# Patient Record
(demographics unavailable — no encounter records)

---

## 2025-06-10 NOTE — HISTORY OF PRESENT ILLNESS
[de-identified] : Patient is a 51 year old F here to establish care and complete physical  interested in testing for lyme testing given potential exposure history of bad welt reaction on arm to yellow jacket bite   chronic cough present for years unclear etiology unpredictable -not necessarily on a daily basis it happens sporadically  cannot figure out the trigger at this time  [FreeTextEntry1] : Nettie Crandall is a 51 year old female here today for establish care and complete physical exam.

## 2025-06-10 NOTE — HEALTH RISK ASSESSMENT
[Very Good] : ~his/her~  mood as very good [Yes] : Yes [1 or 2 (0 pts)] : 1 or 2 (0 points) [2 - 4 times a month (2 pts)] : 2-4 times a month (2 points) [Never (0 pts)] : Never (0 points) [No falls in past year] : Patient reported no falls in the past year [Little interest or pleasure doing things] : 1) Little interest or pleasure doing things [Feeling down, depressed, or hopeless] : 2) Feeling down, depressed, or hopeless [0] : 2) Feeling down, depressed, or hopeless: Not at all (0) [Patient reported mammogram was normal] : Patient reported mammogram was normal [Patient reported PAP Smear was normal] : Patient reported PAP Smear was normal [Patient reported colonoscopy was normal] : Patient reported colonoscopy was normal [HIV test declined] : HIV test declined [Hepatitis C test declined] : Hepatitis C test declined [With Family] : lives with family [Employed] : employed [] :  [Sexually Active] : sexually active [Feels Safe at Home] : Feels safe at home [Fully functional (bathing, dressing, toileting, transferring, walking, feeding)] : Fully functional (bathing, dressing, toileting, transferring, walking, feeding) [Fully functional (using the telephone, shopping, preparing meals, housekeeping, doing laundry, using] : Fully functional and needs no help or supervision to perform IADLs (using the telephone, shopping, preparing meals, housekeeping, doing laundry, using transportation, managing medications and managing finances) [Reports normal functional visual acuity (ie: able to read med bottle)] : Reports normal functional visual acuity [Carbon Monoxide Detector] : carbon monoxide detector [Smoke Detector] : smoke detector [Seat Belt] :  uses seat belt [No] : In the past 12 months have you used drugs other than those required for medical reasons? No [Sunscreen] : uses sunscreen [Never] : Never [de-identified] : every couple of weeks  [Audit-CScore] : 2 [de-identified] : eating healthy, lots of water  [de-identified] : toning / pilates / barre  [[id number]] : 0 [Change in mental status noted] : No change in mental status noted [Behavior] : denies difficulty with behavior [Learning/Retaining New Information] : denies difficulty learning/retaining new information [Handling Complex Tasks] : denies difficulty handling complex tasks [Reasoning] : denies difficulty with reasoning [Spatial Ability and Orientation] : denies difficulty with spatial ability and orientation [Reports changes in hearing] : Reports no changes in hearing [Reports changes in vision] : Reports no changes in vision [Reports changes in dental health] : Reports no changes in dental health [MammogramDate] : 04/2024 [PapSmearDate] : 03/2023 [PapSmearComments] : always normal (either this year or last year) [ColonoscopyDate] : 08/2024 [de-identified] : Dr Ryan - appt in July

## 2025-06-10 NOTE — PLAN
[FreeTextEntry1] : wellness a1c for dm screening colon ca screening utd mammogram ordered lipid panel for ascvd risk calc cbc/cmp lyme panel per patient's request - discussed that no indication for tx if asymptomatic and recommend ppx / empiric abx if within first 72 hrs of bite TDAP administered in office; rec PCV21 / 20 as well; completed shingrix   reaction to bee sting referral to allergy  chronic cough likely 2/2 post nasal drip. patient not bothered enough to take medication for it, but discussed pathophys and possible treatment options

## 2025-06-10 NOTE — PHYSICAL EXAM
[Normal Sclera/Conjunctiva] : normal sclera/conjunctiva [No Lymphadenopathy] : no lymphadenopathy [Supple] : supple [No Edema] : there was no peripheral edema [Coordination Grossly Intact] : coordination grossly intact [Grossly Normal Strength/Tone] : grossly normal strength/tone [No Focal Deficits] : no focal deficits [Normal Gait] : normal gait [Normal] : affect was normal and insight and judgment were intact [de-identified] : left ear with scarring on TM; cobblestoning on oropharynx